# Patient Record
Sex: MALE | Race: BLACK OR AFRICAN AMERICAN | Employment: FULL TIME | URBAN - METROPOLITAN AREA
[De-identification: names, ages, dates, MRNs, and addresses within clinical notes are randomized per-mention and may not be internally consistent; named-entity substitution may affect disease eponyms.]

---

## 2021-06-01 ENCOUNTER — VIRTUAL VISIT (OUTPATIENT)
Dept: FAMILY MEDICINE CLINIC | Age: 26
End: 2021-06-01
Payer: OTHER GOVERNMENT

## 2021-06-01 DIAGNOSIS — R36.9 ABNORMAL PENILE DISCHARGE: Primary | ICD-10-CM

## 2021-06-01 DIAGNOSIS — Z72.51 HIGH RISK SEXUAL BEHAVIOR, UNSPECIFIED TYPE: ICD-10-CM

## 2021-06-01 DIAGNOSIS — R21 PENILE RASH: ICD-10-CM

## 2021-06-01 DIAGNOSIS — Z20.2 EXPOSURE TO SEXUALLY TRANSMITTED DISEASE (STD): ICD-10-CM

## 2021-06-01 PROCEDURE — 99203 OFFICE O/P NEW LOW 30 MIN: CPT | Performed by: FAMILY MEDICINE

## 2021-06-01 RX ORDER — DOXYCYCLINE 100 MG/1
100 CAPSULE ORAL 2 TIMES DAILY
Qty: 20 CAPSULE | Refills: 0 | Status: SHIPPED | OUTPATIENT
Start: 2021-06-01 | End: 2021-06-11

## 2021-06-01 RX ORDER — AZITHROMYCIN 250 MG/1
500 TABLET, FILM COATED ORAL 2 TIMES DAILY
Qty: 4 TABLET | Refills: 0 | Status: SHIPPED | OUTPATIENT
Start: 2021-06-01 | End: 2021-06-02

## 2021-06-01 NOTE — PROGRESS NOTES
HISTORY OF PRESENT ILLNESS  Gregor Heredia is a 32 y.o. male. Rash of the head of the penis    Started few weeks ago not better tried alcohol washing and OTC antibiotic ointments, dosenot tingles and not pain full, states that is notexpanding red, and not  swelled up, whitish patches rounds, with itchiness     penile Discharge   The history is provided by the patient. This is a new problem. The current episode started more than 1 week ago. The problem occurs daily. The problem has not changed since onset. The discharge occurs spontaneously. The discharge was milky, but not cottage cheese like. he has no dyspareunia, frequency, genital burning, genital itching and finally no odor, no dysuria and no genital lesions. his past medical history does not include STD. Specific concerns today: std check pt with hx of unsafe sex denies discharge having no rash no sore throat not using condoms that much, known std expo wants to get all checked has no fever      No Known Allergies  History reviewed. No pertinent past medical history. History reviewed. No pertinent surgical history. Family History   Problem Relation Age of Onset    Diabetes Mother     No Known Problems Father      Social History     Tobacco Use    Smoking status: Never Smoker    Smokeless tobacco: Never Used   Substance Use Topics    Alcohol use: No     Alcohol/week: 0.0 standard drinks      Lab Results   Component Value Date/Time    Hemoglobin (POC) 15.7 07/15/2016 11:00 AM     No results found for: TSH, TSH2, TSH3, TSHP, TSHELE, TSHEXT, TT3, T3U, T3UP, FRT3, FT3, FT4, FT4P, T4, T4P, FT4T, TT7, TSHEXT      Review of Systems   Constitutional: Negative for chills, fever and malaise/fatigue. HENT: Negative for nosebleeds. Eyes: Negative for pain. Respiratory: Negative for cough and wheezing. Cardiovascular: Negative for chest pain and leg swelling. Gastrointestinal: Negative for constipation, diarrhea and nausea.    Genitourinary: Negative for frequency. Musculoskeletal: Negative for joint pain and myalgias. Skin: Negative for rash. Neurological: Negative for speech change, loss of consciousness and headaches. Endo/Heme/Allergies: Does not bruise/bleed easily. Psychiatric/Behavioral: Negative for depression. The patient is not nervous/anxious and does not have insomnia. All other systems reviewed and are negative. Physical Exam  Constitutional:       Appearance: Normal appearance. HENT:      Head: Normocephalic and atraumatic. Nose: Nose normal. No congestion. Neurological:      Mental Status: He is alert and oriented to person, place, and time. Psychiatric:         Mood and Affect: Mood normal.         Behavior: Behavior normal.         Thought Content: Thought content normal.         Judgment: Judgment normal.         ASSESSMENT and PLAN  Orders Placed This Encounter    CT/NG/T.VAGINALIS AMPLIFICATION    RPR    HIV 1/2 AG/AB, 4TH GENERATION,W RFLX CONFIRM    HEPATITIS PANEL, ACUTE    URINALYSIS W/ RFLX MICROSCOPIC     Diagnoses and all orders for this visit:    1. Abnormal penile discharge  -     CT/NG/T.VAGINALIS AMPLIFICATION; Future  -     RPR; Future  -     HIV 1/2 AG/AB, 4TH GENERATION,W RFLX CONFIRM; Future  -     HEPATITIS PANEL, ACUTE; Future  -     URINALYSIS W/ RFLX MICROSCOPIC; Future    2. Exposure to sexually transmitted disease (STD)  -     CT/NG/T.VAGINALIS AMPLIFICATION; Future  -     RPR; Future  -     HIV 1/2 AG/AB, 4TH GENERATION,W RFLX CONFIRM; Future  -     HEPATITIS PANEL, ACUTE; Future  -     URINALYSIS W/ RFLX MICROSCOPIC; Future    3. High risk sexual behavior, unspecified type  -     CT/NG/T.VAGINALIS AMPLIFICATION; Future  -     RPR; Future  -     HIV 1/2 AG/AB, 4TH GENERATION,W RFLX CONFIRM; Future  -     HEPATITIS PANEL, ACUTE; Future  -     URINALYSIS W/ RFLX MICROSCOPIC; Future    4. Penile rash  -     CT/NG/T.VAGINALIS AMPLIFICATION; Future  -     RPR;  Future  -     HIV 1/2 AG/AB, 4TH GENERATION,W RFLX CONFIRM; Future  -     HEPATITIS PANEL, ACUTE; Future  -     URINALYSIS W/ RFLX MICROSCOPIC; Future    Other orders  -     doxycycline (VIBRAMYCIN) 100 mg capsule; Take 1 Capsule by mouth two (2) times a day for 10 days. -     azithromycin (ZITHROMAX) 250 mg tablet; Take 2 Tablets by mouth two (2) times a day for 1 day.       Advised patient for sexual acts abstinence till the results become available  Safe sex, compliancy with antibiotic at this time

## 2021-06-01 NOTE — PROGRESS NOTES
Chief Complaint   Patient presents with   [address] [place] [address] Yevgeniy Establish Care     1. Have you been to the ER, urgent care clinic since your last visit? Hospitalized since your last visit? No    2. Have you seen or consulted any other health care providers outside of the [address] [place] [address] since your last visit? Include any pap smears or colon screening. No    Call placed to pt. Verified patient with two type of identifiers. Here to establish care,  No pcp,  C/o penile itching x 1 week,  Small amount white discharge,  Denies rash,  Unsure of std exposure.

## 2021-06-02 LAB
APPEARANCE UR: CLEAR
BACTERIA URNS QL MICRO: NEGATIVE /HPF
BILIRUB UR QL: NEGATIVE
COLOR UR: ABNORMAL
EPITH CASTS URNS QL MICRO: ABNORMAL /LPF
GLUCOSE UR STRIP.AUTO-MCNC: NEGATIVE MG/DL
HAV IGM SER QL: NONREACTIVE
HBV CORE IGM SER QL: REACTIVE
HBV SURFACE AG SER QL: 909.65 INDEX
HBV SURFACE AG SER QL: POSITIVE
HCV AB SERPL QL IA: NONREACTIVE
HCV COMMENT,HCGAC: ABNORMAL
HGB UR QL STRIP: NEGATIVE
HIV 1+2 AB+HIV1 P24 AG SERPL QL IA: NONREACTIVE
HIV12 RESULT COMMENT, HHIVC: NORMAL
KETONES UR QL STRIP.AUTO: NEGATIVE MG/DL
LEUKOCYTE ESTERASE UR QL STRIP.AUTO: NEGATIVE
NITRITE UR QL STRIP.AUTO: NEGATIVE
PH UR STRIP: 6.5 [PH] (ref 5–8)
PROT UR STRIP-MCNC: 30 MG/DL
RBC #/AREA URNS HPF: ABNORMAL /HPF (ref 0–5)
RPR SER QL: NONREACTIVE
SP GR UR REFRACTOMETRY: 1.01 (ref 1–1.03)
SP1: ABNORMAL
SP2: ABNORMAL
SP3: ABNORMAL
UROBILINOGEN UR QL STRIP.AUTO: 0.2 EU/DL (ref 0.2–1)
WBC URNS QL MICRO: ABNORMAL /HPF (ref 0–4)

## 2021-06-04 LAB
C TRACH RRNA SPEC QL NAA+PROBE: NEGATIVE
N GONORRHOEA RRNA SPEC QL NAA+PROBE: NEGATIVE
SPECIMEN SOURCE: NORMAL
T VAGINALIS RRNA VAG QL NAA+PROBE: NEGATIVE

## 2021-06-10 DIAGNOSIS — R76.8 HEPATITIS B SURFACE ANTIGEN POSITIVE: Primary | ICD-10-CM

## 2021-06-10 DIAGNOSIS — R76.8 HEPATITIS B CORE ANTIBODY POSITIVE: ICD-10-CM

## 2021-06-10 NOTE — PROGRESS NOTES
Lab result discussed with the patient possible hepatitis b safe sex advised rheumatologist referral repeat in 3 months

## 2021-06-10 NOTE — PROGRESS NOTES
At this time patient lab results discussed patient advised to have safe sexual contact follow-up with oncologist for further care erepeat st result in 3 months

## 2021-06-15 ENCOUNTER — PATIENT MESSAGE (OUTPATIENT)
Dept: FAMILY MEDICINE CLINIC | Age: 26
End: 2021-06-15

## 2021-06-16 PROBLEM — B19.10 HEP B W/O COMA: Status: ACTIVE | Noted: 2021-06-16

## 2021-06-17 ENCOUNTER — OFFICE VISIT (OUTPATIENT)
Dept: HEMATOLOGY | Age: 26
End: 2021-06-17
Payer: OTHER GOVERNMENT

## 2021-06-17 VITALS
OXYGEN SATURATION: 99 % | SYSTOLIC BLOOD PRESSURE: 130 MMHG | TEMPERATURE: 96.1 F | HEART RATE: 71 BPM | HEIGHT: 68 IN | RESPIRATION RATE: 16 BRPM | BODY MASS INDEX: 24.4 KG/M2 | DIASTOLIC BLOOD PRESSURE: 83 MMHG | WEIGHT: 161 LBS

## 2021-06-17 DIAGNOSIS — B19.10 HEP B W/O COMA: Primary | ICD-10-CM

## 2021-06-17 LAB
BUN SERPL-MCNC: 8 MG/DL (ref 6–20)
BUN/CREAT SERPL: 7 (ref 9–20)
CALCIUM SERPL-MCNC: 10.2 MG/DL (ref 8.7–10.2)
CHLORIDE SERPL-SCNC: 101 MMOL/L (ref 96–106)
CO2 SERPL-SCNC: 26 MMOL/L (ref 20–29)
CREAT SERPL-MCNC: 1.23 MG/DL (ref 0.76–1.27)
ERYTHROCYTE [DISTWIDTH] IN BLOOD BY AUTOMATED COUNT: 13.2 % (ref 11.6–15.4)
GLUCOSE SERPL-MCNC: 91 MG/DL (ref 65–99)
HCT VFR BLD AUTO: 48.9 % (ref 37.5–51)
HGB BLD-MCNC: 15.9 G/DL (ref 13–17.7)
MCH RBC QN AUTO: 26.3 PG (ref 26.6–33)
MCHC RBC AUTO-ENTMCNC: 32.5 G/DL (ref 31.5–35.7)
MCV RBC AUTO: 81 FL (ref 79–97)
PLATELET # BLD AUTO: 222 X10E3/UL (ref 150–450)
POTASSIUM SERPL-SCNC: 4.3 MMOL/L (ref 3.5–5.2)
RBC # BLD AUTO: 6.04 X10E6/UL (ref 4.14–5.8)
SODIUM SERPL-SCNC: 139 MMOL/L (ref 134–144)
WBC # BLD AUTO: 3.8 X10E3/UL (ref 3.4–10.8)

## 2021-06-17 PROCEDURE — 99204 OFFICE O/P NEW MOD 45 MIN: CPT | Performed by: NURSE PRACTITIONER

## 2021-06-17 NOTE — PROGRESS NOTES
Luan Gates is a 32 y.o. male  Chief Complaint   Patient presents with    New Patient     There are no preventive care reminders to display for this patient. Visit Vitals  /83   Pulse 71   Temp (!) 96.1 °F (35.6 °C) (Temporal)   Resp 16   Ht 5' 7.72\" (1.72 m)   Wt 161 lb (73 kg)   SpO2 99%   BMI 24.68 kg/m²     1. Have you been to the ER, urgent care clinic since your last visit? Hospitalized since your last visit?  No

## 2021-06-17 NOTE — PROGRESS NOTES
[address] [facility], [place], [address], Cite Maribeth Diaz MD Johnathan Race, PA-C Narda Aw, ACNP-BC     Nell Ken, [facility]   ADINA Owens, [facility]       [facility] [address]    at [address], [address] Raul Daniel  22.    [phone]    FAX: [address] [place] [address]    at [address], [address]    [phone]    FAX: [phone]     Patient Care Team:  Dk Castro MD as PCP - General (Family Medicine)  Dk Castro MD as PCP - [facility]    Patient Active Problem List   Diagnosis Code    Hep B w/o coma B19.10     The clinician listed above has asked me to see Mykel Lawrence  in consultation regarding chronic HBV and its management. All medical records sent by the referring physicians were reviewed. The patient is a 32 y.o. Black who has tested positive for HBV in 2021 while having an STD panel performed due to possible exposure. Risk factors for acquiring HBV are immigration from [place] and [place]. There was an episode of acute icteric hepatitis around 2011. He was told he had jaundice. Imaging of the liver was either not performed or the results are not available to me. An assessment of liver fibrosis with biopsy or elastography has not been performed. The patient has never received treatment for chronic HBV. The patient has no symptoms which can be attributed to the liver disorder. The patient is not currently experiencing the following symptoms of liver disease: pain in the right side over the liver, yellowing of the eyes or skin, dark urine or swelling of the abdomen.     The patient completes all daily activities without any functional limitations. ASSESSMENT AND PLAN:  Chronic HBV    The degree of fibrosis has not yet been determined. The level of HBV DNA has not yet been determined. The patient is not currently receiving treatment for HBV. Will perform laboratory testing to monitor liver function and degree of liver injury. This includes BMP, hepatic panel, CBC with platelet count and INR. Will perform serologic and virologic testing of HBV to assess need for treatment, type of treatment, likelihood of responding to treatment and the duration of therapy. Will perform serologic and virologic studies to assess for other causes of chronic liver disease. Will perform imaging of the liver with ultrasound. The need to perform an assessment of liver fibrosis was discussed with the patient. The FibroScan can assess liver fibrosis and determine if a patient has advanced fibrosis or cirrhosis without the need for liver biopsy. This will be performed at the next office visit. If the FibroScan suggests advanced fibrosis, then a liver biopsy should be considered. The FibroScan can be repeated annually or as often as clinically indicated to assess for fibrosis progression and/or regression. Screening for hepatocellular carcinoma  HCC screening in patients with HBV should be performed with ultrasound on an annual basis in patients with inactive HBV who are under the age of 48 years. Over the age of 48 years, [address] screening with ultrasound should be performed every 6 months. Treatment of other medical problems in patients with chronic liver disease  There are no contraindications for the patient to take most medications necessary for treatment of other medical issues. The patient can take any medications utilized for treatment of DM and/or statins to treat hypercholesterolemia. The patient does not consume alcohol on a daily basis.  Normal doses of acetaminophen, as recommended on the label of the bottle, are not hepatotoxic except in the setting of daily alcohol use, even in patients with cirrhosis and can be utilized for pain. Counseling for alcohol in patients with chronic liver disease  The patient was counseled regarding alcohol consumption and the effect of alcohol on chronic liver disease. The patient does not consume any significant amount of alcohol. Vaccinations   The need for vaccination against viral hepatitis A will be assessed with serologic and instituted as appropriate. Routine vaccinations against other bacterial and viral agents can be performed as indicated. Annual flu vaccination should be administered if indicated. No Known Allergies    No current outpatient medications on file prior to visit. No current facility-administered medications on file prior to visit. FAMILY HISTORY:  Mom living with diabetes. It is unknown if she has HBV. The patient does not know his father's medical history. There is no family history of liver disease. There is no family history of immune disorders. SOCIAL HISTORY:  The patient is single. The patient has no children. The patient has never used tobacco products. The patient has never consumed significant amounts of alcohol. The patient currently works full time as an [employer] . He is also a member of the [employer]. PHYSICAL EXAMINATION:  Visit Vitals  /83   Pulse 71   Temp (!) 96.1 °F (35.6 °C) (Temporal)   Resp 16   Ht 5' 7.72\" (1.72 m)   Wt 161 lb (73 kg)   SpO2 99%   BMI 24.68 kg/m²     General: No acute distress. Eyes: Sclera anicteric. ENT: No oral lesions. Nodes: No adenopathy. Skin: No spider angiomata. No jaundice. No palmar erythema. Respiratory: Lungs clear to auscultation. Cardiovascular: Regular heart rate. No murmurs. No JVD. Abdomen: Soft non-tender. Liver size normal to percussion/palpation. Spleen not palpable. No obvious ascites. Extremities: No edema. No muscle wasting. No gross arthritic changes. Neurologic: Alert and oriented. Cranial nerves grossly intact. No asterixis. LABORATORY STUDIES:  Recent liver function panel, CBC with platelet count and BMP are not available. These studies will be performed. SEROLOGIES:  Serologies Latest Ref Rng & Units 6/1/2021   Hep B Surface Ag Index 909.65   Hep B Surface Ag Interp Negative   Positive (A)   Hep C Ab NONREACTIVE   NONREACTIVE     LIVER HISTOLOGY:  Not available or performed    ENDOSCOPIC PROCEDURES:  Not available or performed    RADIOLOGY:  Not available or performed    OTHER TESTING:  Not available or performed    FOLLOW-UP:  All of the issues listed above in the assessment and plan were discussed with the patient. All questions were answered. The patient expressed a clear understanding of the above. [facility] [place] [address] in 4 weeks for FibroScan, to review all data and determine the treatment plan. He is at training for [employer] until September. I will call/message him with the lab results and plan. We will schedule the FibroScan after his training is complete.     Ravi Braswell, [facility] Center[facility]LVAIN-BC  Liver [place] [address] [address] Raul Daniel  22.  [phone]

## 2021-06-18 LAB
ALBUMIN SERPL-MCNC: 5.1 G/DL (ref 4.1–5.2)
ALP SERPL-CCNC: 100 IU/L (ref 48–121)
ALT SERPL-CCNC: 46 IU/L (ref 0–44)
AST SERPL-CCNC: 44 IU/L (ref 0–40)
BILIRUB DIRECT SERPL-MCNC: 0.14 MG/DL (ref 0–0.4)
BILIRUB SERPL-MCNC: 0.5 MG/DL (ref 0–1.2)
HAV AB SER QL IA: POSITIVE
HBV CORE AB SERPL QL IA: POSITIVE
HBV DNA SERPL NAA+PROBE-ACNC: NORMAL IU/ML
HBV DNA SERPL NAA+PROBE-ACNC: NORMAL IU/ML
HBV DNA SERPL NAA+PROBE-LOG IU: 8.56 LOG10 IU/ML
HBV E AB SERPL QL IA: NEGATIVE
HBV E AG SERPL QL IA: POSITIVE
HBV SURFACE AB SER QL: REACTIVE
INR PPP: 1 (ref 0.9–1.2)
PROT SERPL-MCNC: 8 G/DL (ref 6–8.5)
PROTHROMBIN TIME: 11 SEC (ref 9.1–12)
REF LAB TEST REF RANGE: NORMAL

## 2021-06-23 LAB — HDV AB SER QL IA: NEGATIVE

## 2021-09-23 ENCOUNTER — OFFICE VISIT (OUTPATIENT)
Dept: HEMATOLOGY | Age: 26
End: 2021-09-23
Payer: OTHER GOVERNMENT

## 2021-09-23 VITALS
WEIGHT: 160 LBS | BODY MASS INDEX: 24.25 KG/M2 | TEMPERATURE: 96.9 F | RESPIRATION RATE: 16 BRPM | HEART RATE: 69 BPM | SYSTOLIC BLOOD PRESSURE: 114 MMHG | OXYGEN SATURATION: 98 % | HEIGHT: 68 IN | DIASTOLIC BLOOD PRESSURE: 74 MMHG

## 2021-09-23 DIAGNOSIS — B18.0 HEP B W/O COMA, CHRONIC, W/ DELTA (HCC): Primary | ICD-10-CM

## 2021-09-23 LAB
ALBUMIN SERPL-MCNC: 4.8 G/DL (ref 3.5–5)
ALBUMIN/GLOB SERPL: 1.3 {RATIO} (ref 1.1–2.2)
ALP SERPL-CCNC: 103 U/L (ref 45–117)
ALT SERPL-CCNC: 65 U/L (ref 12–78)
ANION GAP SERPL CALC-SCNC: 4 MMOL/L (ref 5–15)
AST SERPL-CCNC: 45 U/L (ref 15–37)
BILIRUB DIRECT SERPL-MCNC: 0.2 MG/DL (ref 0–0.2)
BILIRUB SERPL-MCNC: 0.6 MG/DL (ref 0.2–1)
BUN SERPL-MCNC: 9 MG/DL (ref 6–20)
BUN/CREAT SERPL: 8 (ref 12–20)
CALCIUM SERPL-MCNC: 9.6 MG/DL (ref 8.5–10.1)
CHLORIDE SERPL-SCNC: 103 MMOL/L (ref 97–108)
CO2 SERPL-SCNC: 29 MMOL/L (ref 21–32)
CREAT SERPL-MCNC: 1.07 MG/DL (ref 0.7–1.3)
ERYTHROCYTE [DISTWIDTH] IN BLOOD BY AUTOMATED COUNT: 12.6 % (ref 11.5–14.5)
GLOBULIN SER CALC-MCNC: 3.7 G/DL (ref 2–4)
GLUCOSE SERPL-MCNC: 85 MG/DL (ref 65–100)
HCT VFR BLD AUTO: 50.9 % (ref 36.6–50.3)
HGB BLD-MCNC: 16 G/DL (ref 12.1–17)
INR PPP: 1 (ref 0.9–1.1)
MCH RBC QN AUTO: 25.7 PG (ref 26–34)
MCHC RBC AUTO-ENTMCNC: 31.4 G/DL (ref 30–36.5)
MCV RBC AUTO: 81.8 FL (ref 80–99)
NRBC # BLD: 0 K/UL (ref 0–0.01)
NRBC BLD-RTO: 0 PER 100 WBC
PLATELET # BLD AUTO: 213 K/UL (ref 150–400)
PMV BLD AUTO: 11.2 FL (ref 8.9–12.9)
POTASSIUM SERPL-SCNC: 4.1 MMOL/L (ref 3.5–5.1)
PROT SERPL-MCNC: 8.5 G/DL (ref 6.4–8.2)
PROTHROMBIN TIME: 10.8 SEC (ref 9–11.1)
RBC # BLD AUTO: 6.22 M/UL (ref 4.1–5.7)
SODIUM SERPL-SCNC: 136 MMOL/L (ref 136–145)
WBC # BLD AUTO: 4.6 K/UL (ref 4.1–11.1)

## 2021-09-23 PROCEDURE — 91200 LIVER ELASTOGRAPHY: CPT | Performed by: NURSE PRACTITIONER

## 2021-09-23 PROCEDURE — 99214 OFFICE O/P EST MOD 30 MIN: CPT | Performed by: NURSE PRACTITIONER

## 2021-09-23 NOTE — PROGRESS NOTES
[address] [facility] [address], [place], [address] [address], [facility] Nolan Bustillo, MD Tiffani Sierra PA-C Lari Pal, [facility]-BC     Nell Ken, [facility]   Gunner Gonzales, SYLVAIN-MO Myers, [facility]       Kandicearlyn Fox Andrea [address]    at [address], [address] [place] [address], Shwetaliudmila  22.    [phone]    FAX: [address] [place] [address]    at [address], [address]    [phone]    FAX: [phone]     Patient Care Team:  Cici Campos MD as PCP - General (Family Medicine)  Cici Campos MD as PCP - [facility] Provider    Patient Active Problem List   Diagnosis Code    Hep B w/o coma B19.10     Juan Motat is being seen at [facility] for management of chronic HBV. The active problem list, all pertinent past medical history, medications and laboratory findings related to the liver disorder were reviewed and discussed with the patient. The patient is a 32 y.o. Black who has tested positive for HBV in 2021 while having an STD panel performed due to possible exposure. Risk factors for acquiring HBV are immigration from [place] and [place]. There was an episode of acute icteric hepatitis around 2011. He was told he had jaundice. An assessment of liver fibrosis with elastography will be performed during this office visit. The patient has never received treatment for chronic HBV. The patient has no symptoms which can be attributed to the liver disorder. The patient is not currently experiencing the following symptoms of liver disease: pain in the right side over the liver, yellowing of the eyes or skin, dark urine or swelling of the abdomen. The patient completes all daily activities without any functional limitations.     ASSESSMENT AND PLAN:  Chronic HBV  The patient has no fibrosis. He has a high viral load at 361,000,000. His ALT is mildly elevated. I discussed the patient with Dr. Kofi Lopez and we decided the patient can make the decision about starting therapy. Since he has no fibrosis, I am comfortable waiting on initiating treatment. I will get labs today and if the LFTs are more elevated, I say initiate treatment. I advised the patient he is contagious and to practice safe sex every single time. He needs to let any future partner know about his active infection. The patient is not currently receiving treatment for HBV. Will check viral load again today. Will perform laboratory testing to monitor liver function and degree of liver injury. This includes BMP, hepatic panel, CBC with platelet count and INR. The FibroScan can be repeated annually or as often as clinically indicated to assess for fibrosis progression and/or regression. Screening for hepatocellular carcinoma  HCC screening in patients with HBV should be performed with ultrasound on an annual basis in patients with inactive HBV who are under the age of 48 years. This was ordered today. Treatment of other medical problems in patients with chronic liver disease  There are no contraindications for the patient to take most medications necessary for treatment of other medical issues. The patient can take any medications utilized for treatment of DM and/or statins to treat hypercholesterolemia. The patient does not consume alcohol on a daily basis. Normal doses of acetaminophen, as recommended on the label of the bottle, are not hepatotoxic except in the setting of daily alcohol use, even in patients with cirrhosis and can be utilized for pain. Counseling for alcohol in patients with chronic liver disease  The patient was counseled regarding alcohol consumption and the effect of alcohol on chronic liver disease.     The patient does not consume any significant amount of alcohol. Vaccinations   He has documented immunity to viral hepatitis A. Routine vaccinations against other bacterial and viral agents can be performed as indicated. Annual flu vaccination should be administered if indicated. No Known Allergies    No current outpatient medications on file prior to visit. No current facility-administered medications on file prior to visit. FAMILY HISTORY:  Mom living with diabetes. It is unknown if she has HBV. The patient does not know his father's medical history. There is no family history of liver disease. There is no family history of immune disorders. SOCIAL HISTORY:  The patient is single. The patient has no children. The patient has never used tobacco products. The patient has never consumed significant amounts of alcohol. The patient currently works full time as an [employer] . He is also a member of the [employer]. PHYSICAL EXAMINATION:  Visit Vitals  /74 (BP 1 Location: Left upper arm, BP Patient Position: Sitting, BP Cuff Size: Adult)   Pulse 69   Temp 96.9 °F (36.1 °C) (Temporal)   Resp 16   Ht 5' 7.72\" (1.72 m)   Wt 160 lb (72.6 kg)   SpO2 98%   BMI 24.53 kg/m²     General: No acute distress. Eyes: Sclera anicteric. ENT: No oral lesions. Nodes: No adenopathy. Skin: No spider angiomata. No jaundice. No palmar erythema. Respiratory: Lungs clear to auscultation. Cardiovascular: Regular heart rate. No murmurs. No JVD. Abdomen: Soft non-tender. Liver size normal to percussion/palpation. Spleen not palpable. No obvious ascites. Extremities: No edema. No muscle wasting. No gross arthritic changes. Neurologic: Alert and oriented. Cranial nerves grossly intact. No asterixis.     LABORATORY STUDIES:  Liver [place] [address] Units 6/17/2021   WBC 3.4 - 10.8 x10E3/uL 3.8   HGB 13.0 - 17.7 g/dL 15.9    - 450 x10E3/uL 222   INR 0.9 - 1.2 1.0   AST 0 - 40 IU/L 44 (H)   ALT 0 - 44 IU/L 46 (H)   Alk Phos 48 - 121 IU/L 100   Bili, Total 0.0 - 1.2 mg/dL 0.5   Bili, Direct 0.00 - 0.40 mg/dL 0.14   Albumin 4.1 - 5.2 g/dL 5.1   BUN 6 - 20 mg/dL 8   Creat 0.76 - 1.27 mg/dL 1.23   Na 134 - 144 mmol/L 139   K 3.5 - 5.2 mmol/L 4.3   Cl 96 - 106 mmol/L 101   CO2 20 - 29 mmol/L 26   Glucose 65 - 99 mg/dL 91     SEROLOGIES:  Virology Latest Ref Rng & Units 6/17/2021 6/17/2021   HBV DNA IU/mL 361,000,000 See Final Results     Serologies Latest Ref Rng & Units 6/17/2021 6/1/2021   Hep A Ab, Total Negative Positive (A)    Hep B Surface Ag Index  909.65   Hep B Surface Ag Interp Negative    Positive (A)   Hep B Core Ab, Total Negative Positive (A)    Hep B Surface AB QL  Reactive    Hep C Ab NONREACTIVE    NONREACTIVE   Hep D AB Negative Negative      LIVER HISTOLOGY:  9/2021. FibroScan performed at [address]. EkPa was 5.4. IQR/med 4%. . The results suggested a fibrosis level of F0. The CAP score suggests fatty liver. ENDOSCOPIC PROCEDURES:  Not available or performed    RADIOLOGY:  Not available or performed    OTHER TESTING:  Not available or performed    FOLLOW-UP:  All of the issues listed above in the assessment and plan were discussed with the patient. All questions were answered. The patient expressed a clear understanding of the above. [facility] 87 in 6 months. I will call him next week after results are back and discuss options. I have provided him with a [employer] pamphlet to review in the interim.      Sammy Bettencourt, [facility] CenterP-BC  Liver [place] [address], [address] [facility], [facility] 22. [phone]

## 2021-09-23 NOTE — PROGRESS NOTES
Identified pt with two pt identifiers(name and ). Reviewed record in preparation for visit and have obtained necessary documentation. Chief Complaint   Patient presents with    Hepatitis B     Fibroscan f/u      Vitals:    21 0836   BP: 114/74   Pulse: 69   Resp: 16   Temp: 96.9 °F (36.1 °C)   TempSrc: Temporal   SpO2: 98%   Weight: 160 lb (72.6 kg)   Height: 5' 7.72\" (1.72 m)   PainSc:   0 - No pain       Health Maintenance Review: Patient reminded of \"due or due soon\" health maintenance. I have asked the patient to contact his/her primary care provider (PCP) for follow-up on his/her health maintenance. Coordination of Care Questionnaire:  :   1) Have you been to an emergency room, urgent care, or hospitalized since your last visit? If yes, where when, and reason for visit? no       2. Have seen or consulted any other health care provider since your last visit? If yes, where when, and reason for visit? NO      Patient is accompanied by self I have received verbal consent from Ely Godwin to discuss any/all medical information while they are present in the room.

## 2021-09-24 LAB
AFP L3 MFR SERPL: NORMAL % (ref 0–9.9)
AFP SERPL-MCNC: 2.6 NG/ML (ref 0–8)

## 2021-09-26 LAB
HBV DNA SERPL NAA+PROBE-ACNC: NORMAL IU/ML
HBV IU/ML: NORMAL IU/ML
LOG10 HBV AS IU/ML 551596: 8.46 LOG10 IU/ML
TEST INFORMATION: NORMAL

## 2021-09-27 ENCOUNTER — HOSPITAL ENCOUNTER (OUTPATIENT)
Dept: ULTRASOUND IMAGING | Age: 26
Discharge: HOME | End: 2021-09-27
Payer: OTHER GOVERNMENT

## 2021-09-27 DIAGNOSIS — B18.0 HEP B W/O COMA, CHRONIC, W/ DELTA (HCC): ICD-10-CM

## 2021-09-27 PROCEDURE — 76700 US EXAM ABDOM COMPLETE: CPT | Performed by: NURSE PRACTITIONER

## 2021-09-27 NOTE — PROGRESS NOTES
Flory Montes, no masses or lesions on your ultrasound. Your viral load is high again and your liver numbers went up a little bit on your labs. I'm still planning on calling you to discuss treatment. It may be this afternoon or tomorrow.      Der

## 2021-10-12 NOTE — PROGRESS NOTES
Called pt to recommend starting treatment. He does not wish to proceed at this time. Before we had discussed waiting but this check his ALT was more elevated and his VL is still high. However, he still has no scarring. He would like to wait on starting treatment and see how his numbers look at the next follow up visit in March and make a decision about starting treatment at that time. That is fine.

## 2022-03-19 PROBLEM — B19.10 HEP B W/O COMA: Status: ACTIVE | Noted: 2021-06-16

## 2022-03-24 ENCOUNTER — OFFICE VISIT (OUTPATIENT)
Dept: HEMATOLOGY | Age: 27
End: 2022-03-24
Payer: OTHER GOVERNMENT

## 2022-03-24 VITALS
WEIGHT: 157 LBS | SYSTOLIC BLOOD PRESSURE: 112 MMHG | BODY MASS INDEX: 24.64 KG/M2 | TEMPERATURE: 96.7 F | DIASTOLIC BLOOD PRESSURE: 77 MMHG | HEART RATE: 77 BPM | HEIGHT: 67 IN | RESPIRATION RATE: 16 BRPM | OXYGEN SATURATION: 99 %

## 2022-03-24 DIAGNOSIS — B19.10 HEP B W/O COMA: Primary | ICD-10-CM

## 2022-03-24 LAB
ALBUMIN SERPL-MCNC: 4.8 G/DL (ref 3.5–5)
ALBUMIN/GLOB SERPL: 1.5 {RATIO} (ref 1.1–2.2)
ALP SERPL-CCNC: 89 U/L (ref 45–117)
ALT SERPL-CCNC: 66 U/L (ref 12–78)
ANION GAP SERPL CALC-SCNC: 6 MMOL/L (ref 5–15)
AST SERPL-CCNC: 33 U/L (ref 15–37)
BILIRUB DIRECT SERPL-MCNC: 0.1 MG/DL (ref 0–0.2)
BILIRUB SERPL-MCNC: 0.4 MG/DL (ref 0.2–1)
BUN SERPL-MCNC: 9 MG/DL (ref 6–20)
BUN/CREAT SERPL: 9 (ref 12–20)
CALCIUM SERPL-MCNC: 9.7 MG/DL (ref 8.5–10.1)
CHLORIDE SERPL-SCNC: 104 MMOL/L (ref 97–108)
CO2 SERPL-SCNC: 27 MMOL/L (ref 21–32)
CREAT SERPL-MCNC: 1.02 MG/DL (ref 0.7–1.3)
ERYTHROCYTE [DISTWIDTH] IN BLOOD BY AUTOMATED COUNT: 13.4 % (ref 11.5–14.5)
GLOBULIN SER CALC-MCNC: 3.3 G/DL (ref 2–4)
GLUCOSE SERPL-MCNC: 92 MG/DL (ref 65–100)
HCT VFR BLD AUTO: 49.9 % (ref 36.6–50.3)
HGB BLD-MCNC: 15.7 G/DL (ref 12.1–17)
INR PPP: 1 (ref 0.9–1.1)
MCH RBC QN AUTO: 26.2 PG (ref 26–34)
MCHC RBC AUTO-ENTMCNC: 31.5 G/DL (ref 30–36.5)
MCV RBC AUTO: 83.2 FL (ref 80–99)
NRBC # BLD: 0 K/UL (ref 0–0.01)
NRBC BLD-RTO: 0 PER 100 WBC
PLATELET # BLD AUTO: 195 K/UL (ref 150–400)
PMV BLD AUTO: 11.3 FL (ref 8.9–12.9)
POTASSIUM SERPL-SCNC: 4.2 MMOL/L (ref 3.5–5.1)
PROT SERPL-MCNC: 8.1 G/DL (ref 6.4–8.2)
PROTHROMBIN TIME: 10.9 SEC (ref 9–11.1)
RBC # BLD AUTO: 6 M/UL (ref 4.1–5.7)
SODIUM SERPL-SCNC: 137 MMOL/L (ref 136–145)
WBC # BLD AUTO: 3.8 K/UL (ref 4.1–11.1)

## 2022-03-24 PROCEDURE — 99214 OFFICE O/P EST MOD 30 MIN: CPT | Performed by: NURSE PRACTITIONER

## 2022-03-24 NOTE — PROGRESS NOTES
[address] [facility], [place], [address], [facility], [place]      Lizzie Casas, JIMENA Cullen, ACNP-BC     Nell Ken, [facility]aleNP-BC   LAWRENCE VerduzcoP-MO Cid, [facility]       Kandice Fox [facility] [address]    at [address], [facility] Raul Daniel  22.    [phone]    FAX: [address] [place] [address]    at [address], [address]    [phone]    FAX: [phone]     Patient Care Team:  Román Norwood MD as PCP - General (Family Medicine)  Román Norwood MD as PCP - [facility]    Patient Active Problem List   Diagnosis Code    Hep B w/o coma B19.10     Calista Damon is being seen at [address] for management of chronic HBV. The active problem list, all pertinent past medical history, medications and laboratory findings related to the liver disorder were reviewed and discussed with the patient. The patient is a 32 y.o. Black who has tested positive for HBV in 2021 while having an STD panel performed due to possible exposure. Risk factors for acquiring HBV are immigration from [place] and [place]. There was an episode of acute icteric hepatitis around 2011. He was told he had jaundice. An assessment of liver fibrosis with elastography will be performed during this office visit. The patient has never received treatment for chronic HBV. The patient has no symptoms which can be attributed to the liver disorder. The patient is not currently experiencing the following symptoms of liver disease: pain in the right side over the liver, yellowing of the eyes or skin, dark urine or swelling of the abdomen. The patient completes all daily activities without any functional limitations.     ASSESSMENT AND PLAN:  Chronic HBV  The patient has no fibrosis. He has a high viral load at 361,000,000. His ALT is mildly elevated. We discussed treatment again today. Without fibrosis and his age, I'm still hesitant to start treatment. I gave him the option of starting treatment and he would like to continue to wait at this time. I advised the patient he is contagious and to practice safe sex every single time. He needs to let any future partner know about his active infection. The patient is not currently receiving treatment for HBV. Will check viral load again today. Will perform laboratory testing to monitor liver function and degree of liver injury. This includes BMP, hepatic panel, CBC with platelet count and INR. Labs today and labs/US for before next visit. Next visit I will repeat the FibroScan. The FibroScan can be repeated annually or as often as clinically indicated to assess for fibrosis progression and/or regression. Screening for hepatocellular carcinoma  HCC screening in patients with HBV should be performed with ultrasound on an annual basis in patients with inactive HBV who are under the age of 48 years. This was ordered today. Treatment of other medical problems in patients with chronic liver disease  There are no contraindications for the patient to take most medications necessary for treatment of other medical issues. The patient can take any medications utilized for treatment of DM and/or statins to treat hypercholesterolemia. The patient does not consume alcohol on a daily basis. Normal doses of acetaminophen, as recommended on the label of the bottle, are not hepatotoxic except in the setting of daily alcohol use, even in patients with cirrhosis and can be utilized for pain. Counseling for alcohol in patients with chronic liver disease  The patient was counseled regarding alcohol consumption and the effect of alcohol on chronic liver disease.     The patient does not consume any significant amount of alcohol. Vaccinations   He has documented immunity to viral hepatitis A. Routine vaccinations against other bacterial and viral agents can be performed as indicated. Annual flu vaccination should be administered if indicated. No Known Allergies    No current outpatient medications on file prior to visit. No current facility-administered medications on file prior to visit. FAMILY HISTORY:  Mom living with diabetes. It is unknown if she has HBV. The patient does not know his father's medical history. There is no family history of liver disease. There is no family history of immune disorders. SOCIAL HISTORY:  The patient is single. The patient has no children. The patient has never used tobacco products. The patient has never consumed significant amounts of alcohol. The patient currently works full time as an [employer] . He is also a member of the [employer]. PHYSICAL EXAMINATION:  Visit Vitals  /77 (BP 1 Location: Right upper arm, BP Patient Position: Sitting, BP Cuff Size: Adult)   Pulse 77   Temp (!) 96.7 °F (35.9 °C) (Temporal)   Resp 16   Ht 5' 7\" (1.702 m)   Wt 157 lb (71.2 kg)   SpO2 99%   BMI 24.59 kg/m²     General: No acute distress. Eyes: Sclera anicteric. ENT: No oral lesions. Nodes: No adenopathy. Skin: No spider angiomata. No jaundice. No palmar erythema. Respiratory: Lungs clear to auscultation. Cardiovascular: Regular heart rate. No murmurs. No JVD. Abdomen: Soft non-tender. Liver size normal to percussion/palpation. Spleen not palpable. No obvious ascites. Extremities: No edema. No muscle wasting. No gross arthritic changes. Neurologic: Alert and oriented. Cranial nerves grossly intact. No asterixis.     LABORATORY STUDIES:  Liver [place] of [address] [place] [address] & Units 9/23/2021 6/17/2021   WBC 4.1 - 11.1 K/uL 4.6 3.8   HGB 12.1 - 17.0 g/dL 16.0 15.9    - 400 K/uL 213 222   INR 0.9 - 1.1   1.0 1.0 AST 15 - 37 U/L 45 (H) 44 (H)   ALT 12 - 78 U/L 65 46 (H)   Alk Phos 45 - 117 U/L 103 100   Bili, Total 0.2 - 1.0 MG/DL 0.6 0.5   Bili, Direct 0.0 - 0.2 MG/DL 0.2 0.14   Albumin 3.5 - 5.0 g/dL 4.8 5.1   BUN 6 - 20 MG/DL 9 8   Creat 0.70 - 1.30 MG/DL 1.07 1.23   Na 136 - 145 mmol/L 136 139   K 3.5 - 5.1 mmol/L 4.1 4.3   Cl 97 - 108 mmol/L 103 101   CO2 21 - 32 mmol/L 29 26   Glucose 65 - 100 mg/dL 85 91     SEROLOGIES:  Virology Latest Ref Rng & Units 6/17/2021 6/17/2021   HBV DNA IU/mL 361,000,000 See Final Results     Serologies Latest Ref Rng & Units 6/17/2021 6/1/2021   Hep A Ab, Total Negative Positive (A)    Hep B Surface Ag Index  909.65   Hep B Surface Ag Interp Negative    Positive (A)   Hep B Core Ab, Total Negative Positive (A)    Hep B Surface AB QL  Reactive    Hep C Ab NONREACTIVE    NONREACTIVE   Hep D AB Negative Negative      LIVER HISTOLOGY:  9/2021. FibroScan performed at [facility]. EkPa was 5.4. IQR/med 4%. . The results suggested a fibrosis level of F0. The CAP score suggests fatty liver. ENDOSCOPIC PROCEDURES:  Not available or performed    RADIOLOGY:  9/27/2021. Abdominal ultrasound. No masses or lesions. OTHER TESTING:  Not available or performed    FOLLOW-UP:  All of the issues listed above in the assessment and plan were discussed with the patient. All questions were answered. The patient expressed a clear understanding of the above. [address] in 6 months. I will review results over MyChart.      April Ivan, [facility] PhoenixP-BC  Liver [facility] [address] [employer] [place], [address] Raul Daniel  22. [phone]

## 2022-03-24 NOTE — PROGRESS NOTES
Identified pt with two pt identifiers(name and ). Reviewed record in preparation for visit and have obtained necessary documentation. Chief Complaint   Patient presents with    Hepatitis B     6 mo f/u      Vitals:    22 1023   BP: 112/77   Pulse: 77   Resp: 16   Temp: (!) 96.7 °F (35.9 °C)   TempSrc: Temporal   SpO2: 99%   Weight: 157 lb (71.2 kg)   Height: 5' 7\" (1.702 m)   PainSc:   0 - No pain       Health Maintenance Review: Patient reminded of \"due or due soon\" health maintenance. I have asked the patient to contact his/her primary care provider (PCP) for follow-up on his/her health maintenance. Coordination of Care Questionnaire:  :   1) Have you been to an emergency room, urgent care, or hospitalized since your last visit? If yes, where when, and reason for visit? no       2. Have seen or consulted any other health care provider since your last visit? If yes, where when, and reason for visit? NO      Patient is accompanied by self I have received verbal consent from Carlos Hammond to discuss any/all medical information while they are present in the room.

## 2022-03-25 LAB
AFP L3 MFR SERPL: NORMAL % (ref 0–9.9)
AFP SERPL-MCNC: 2.6 NG/ML (ref 0–5.7)

## 2022-03-26 LAB
HBV DNA SERPL NAA+PROBE-ACNC: NORMAL IU/ML
HBV IU/ML: NORMAL IU/ML
LOG10 HBV AS IU/ML 551596: 8.8 LOG10 IU/ML
TEST INFORMATION: NORMAL

## 2022-09-23 ENCOUNTER — HOSPITAL ENCOUNTER (OUTPATIENT)
Dept: ULTRASOUND IMAGING | Age: 27
Discharge: HOME | End: 2022-09-23
Attending: NURSE PRACTITIONER
Payer: OTHER GOVERNMENT

## 2022-09-23 DIAGNOSIS — B19.10 HEP B W/O COMA: ICD-10-CM

## 2022-09-23 PROCEDURE — 76700 US EXAM ABDOM COMPLETE: CPT

## 2022-09-27 NOTE — PROGRESS NOTES
Pt notified via [address] letter of stable findings, follow-up as scheduled in 10 days for in person review/labs. detailed exam PERRL/conjunctiva clear/EOMI

## 2022-10-06 ENCOUNTER — OFFICE VISIT (OUTPATIENT)
Dept: HEMATOLOGY | Age: 27
End: 2022-10-06
Payer: OTHER GOVERNMENT

## 2022-10-06 VITALS
TEMPERATURE: 97.9 F | WEIGHT: 151.4 LBS | HEIGHT: 67 IN | BODY MASS INDEX: 23.76 KG/M2 | OXYGEN SATURATION: 99 % | SYSTOLIC BLOOD PRESSURE: 123 MMHG | HEART RATE: 73 BPM | DIASTOLIC BLOOD PRESSURE: 88 MMHG

## 2022-10-06 DIAGNOSIS — B19.10 HEP B W/O COMA: Primary | ICD-10-CM

## 2022-10-06 PROCEDURE — 91200 LIVER ELASTOGRAPHY: CPT | Performed by: PHYSICIAN ASSISTANT

## 2022-10-06 PROCEDURE — 99214 OFFICE O/P EST MOD 30 MIN: CPT | Performed by: PHYSICIAN ASSISTANT

## 2022-10-06 NOTE — PROGRESS NOTES
Identified pt with two pt identifiers(name and ). Reviewed record in preparation for visit and have obtained necessary documentation. Chief Complaint   Patient presents with    Hepatitis B     FS 6month follow up with Kimmy Rodriguez:    10/06/22 1337   BP: 123/88   Pulse: 73   Temp: 97.9 °F (36.6 °C)   TempSrc: Temporal   SpO2: 99%   Weight: 151 lb 6.4 oz (68.7 kg)   Height: 5' 7\" (1.702 m)   PainSc:   0 - No pain       Health Maintenance Review: Patient reminded of \"due or due soon\" health maintenance. I have asked the patient to contact his/her primary care provider (PCP) for follow-up on his/her health maintenance. Coordination of Care Questionnaire:  :   1) Have you been to an emergency room, urgent care, or hospitalized since your last visit? If yes, where when, and reason for visit? no       2. Have seen or consulted any other health care provider since your last visit? If yes, where when, and reason for visit? NO      Patient is accompanied by self I have received verbal consent from Wayne Crowe to discuss any/all medical information while they are present in the room.

## 2022-10-06 NOTE — PROGRESS NOTES
3340 [facility], [place], [address], [place], [place]      JIMENA Moreno, [facility]-BC   Juwan Lerma, JENNIFER-   Abiola Guillermo, EMMA-[place], FNP-C    Pawan Cobian, [facility]-BC       Kandice Fox Andrea [address] 136    at [address], [facility] Raul Daniel  22.    [phone]    FAX: [address] [place] [address]    at [address], [address]    [phone]    FAX: [phone]     Patient Care Team:  Telma Arreola MD as PCP - General (Family Medicine)  Telma Arreola MD as PCP - [facility] Provider    Patient Active Problem List   Diagnosis Code    Hep B w/o coma B19.10     Anna Shaikh is being seen at [facility] for management of chronic HBV. The active problem list, all pertinent past medical history, medications and laboratory findings related to the liver disorder were reviewed and discussed with the patient. The patient is a 32 y.o. Black who has tested positive for HBV in 2021 while having an STD panel performed due to possible exposure. Risk factors for acquiring HBV are [place]. There was an episode of acute icteric hepatitis around 2011. He is not aware of any other family members with HBV and there is no known family history of [address] An assessment of liver fibrosis with elastography ws performed one year ago suggesting no fibrosis. This is planned for repeat assessment in the office today. The patient has never received treatment for chronic HBV. The patient has no symptoms which can be attributed to the liver disorder.     The patient is not currently experiencing the following symptoms of liver disease: pain in the right side over the liver, yellowing of the eyes or skin, dark urine or swelling of the abdomen. The patient completes all daily activities without any functional limitations. ASSESSMENT AND PLAN:  Chronic HBV  The patient is not currently receiving treatment for HBV. The patient has no fibrosis on past fibroscan. This is confirmed on today's study. He has a high viral load at >600,000,000. His ALT is twice ULN for HBV. We discussed treatment again today. I have reviewed with him that he meets criteria on the basis of the viral load and ALT to initiate treatment even in the absence of fibrosis. Treatment would be expected to bring viral load down with suppression which would normalize liver enzymes and reduce risk for liver cancer development. We would plan on following eAg status for possible seroconversion as well. He is agreeable to starting treatment if current labs continue to show elevation in liver enzymes and I will inform him of these findings as available and order medication as appropriate. I have explained to him the administration and low potential for side effects with HBV medications. I advised the patient he is potentially contagious to others and to vaccinate any household contacts and practice safe sex every single time. He needs to let any future partner know about his active infection. Will perform laboratory testing to monitor liver function and degree of liver injury. This includes BMP, hepatic panel, CBC with platelet count and AFP. The FibroScan can be repeated annually or as often as clinically indicated to assess for fibrosis progression and/or regression. Screening for hepatocellular carcinoma  HCC screening in patients with HBV should be performed with ultrasound on an annual basis in patients with inactive HBV who are under the age of 48 years. This was recently done in 9/2022 and is due for repeat in 9/2023. I have reviewed these negative findings with the patient in detail.      Treatment of other medical problems in patients with chronic liver disease  There are no contraindications for the patient to take most medications necessary for treatment of other medical issues. The patient does not consume alcohol on a daily basis. Normal doses of acetaminophen, as recommended on the label of the bottle, are not hepatotoxic except in the setting of daily alcohol use, even in patients with cirrhosis and can be utilized for pain. Counseling for alcohol in patients with chronic liver disease  The patient was counseled regarding alcohol consumption and the effect of alcohol on chronic liver disease. The patient does not consume any significant amount of alcohol. Vaccinations   He has documented immunity to viral hepatitis A. Routine vaccinations against other bacterial and viral agents can be performed as indicated. Annual flu vaccination should be administered if indicated. No Known Allergies    No current outpatient medications on file prior to visit. No current facility-administered medications on file prior to visit. FAMILY HISTORY:  Mom living with diabetes. It is unknown if she has HBV. The patient does not know his father's medical history. There is no family history of liver disease. There is no family history of immune disorders. SOCIAL HISTORY:  The patient is single. The patient has no children. The patient has never used tobacco products. The patient has never consumed significant amounts of alcohol. The patient currently works full time as a [employer] . He is also a member of the [employer]. PHYSICAL EXAMINATION:  Visit Vitals  /88 (BP 1 Location: Right arm, BP Patient Position: Sitting, BP Cuff Size: Adult)   Pulse 73   Temp 97.9 °F (36.6 °C) (Temporal)   Ht 5' 7\" (1.702 m)   Wt 151 lb 6.4 oz (68.7 kg)   SpO2 99%   BMI 23.71 kg/m²     General: No acute distress. Eyes: Sclera anicteric. ENT: No oral lesions. Nodes: No adenopathy. Skin: No spider angiomata. No jaundice. No palmar erythema. Respiratory: Lungs clear to auscultation. Cardiovascular: Regular heart rate. No murmurs. No JVD. Abdomen: Soft non-tender. Liver size normal to percussion/palpation. Spleen not palpable. No obvious ascites. Extremities: No edema. No muscle wasting. No gross arthritic changes. Neurologic: Alert and oriented. Cranial nerves grossly intact. No asterixis.     LABORATORY STUDIES:  [address] & Units 3/24/2022 9/23/2021   WBC 4.1 - 11.1 K/uL 3.8 (L) 4.6   HGB 12.1 - 17.0 g/dL 15.7 16.0    - 400 K/uL 195 213   INR 0.9 - 1.1   1.0 1.0   AST 15 - 37 U/L 33 45 (H)   ALT 12 - 78 U/L 66 65   Alk Phos 45 - 117 U/L 89 103   Bili, Total 0.2 - 1.0 MG/DL 0.4 0.6   Bili, Direct 0.0 - 0.2 MG/DL 0.1 0.2   Albumin 3.5 - 5.0 g/dL 4.8 4.8   BUN 6 - 20 MG/DL 9 9   Creat 0.70 - 1.30 MG/DL 1.02 1.07   Na 136 - 145 mmol/L 137 136   K 3.5 - 5.1 mmol/L 4.2 4.1   Cl 97 - 108 mmol/L 104 103   CO2 21 - 32 mmol/L 27 29   Glucose 65 - 100 mg/dL 92 85   Cancer Screening Latest Ref Rng & Units 3/24/2022 9/23/2021   AFP, Serum 0.0 - 5.7 ng/mL 2.6 2.6   AFP-L3% 0.0 - 9.9 % Comment Comment     Liver [facility] Latest Ref Rng & Units 6/17/2021   WBC 4.1 - 11.1 K/uL 3.8   HGB 12.1 - 17.0 g/dL 15.9    - 400 K/uL 222   INR 0.9 - 1.1   1.0   AST 15 - 37 U/L 44 (H)   ALT 12 - 78 U/L 46 (H)   Alk Phos 45 - 117 U/L 100   Bili, Total 0.2 - 1.0 MG/DL 0.5   Bili, Direct 0.0 - 0.2 MG/DL 0.14   Albumin 3.5 - 5.0 g/dL 5.1   BUN 6 - 20 MG/DL 8   Creat 0.70 - 1.30 MG/DL 1.23   Na 136 - 145 mmol/L 139   K 3.5 - 5.1 mmol/L 4.3   Cl 97 - 108 mmol/L 101   CO2 21 - 32 mmol/L 26   Glucose 65 - 100 mg/dL 91   Cancer Screening Latest Ref Rng & Units    AFP, Serum 0.0 - 5.7 ng/mL    AFP-L3% 0.0 - 9.9 %      Virology Latest Ref Rng & Units 3/24/2022 3/24/2022 9/23/2021   HBV DNA IU/mL 633,000,000 See Final Results 286,000,000   Additional lab values drawn at today's office visit are pending at the time of documentation. SEROLOGIES:    Serologies Latest Ref Rng & Units 6/17/2021 6/1/2021   Hep A Ab, Total Negative Positive (A)    Hep B Surface Ag Index  909.65   Hep B Surface Ag Interp Negative    Positive (A)   Hep B Core Ab, Total Negative Positive (A)    Hep B Surface AB QL  Reactive    Hep C Ab NONREACTIVE    NONREACTIVE   Hep D AB Negative Negative    2021. Hep Be Ag pos, Hep Be Ab pos    LIVER HISTOLOGY:  9/2021. FibroScan performed at [facility]. EkPa was 5.4. IQR/med 4%. . The results suggested a fibrosis level of F0. The CAP score suggests fatty liver. 10/2022. FibroScan performed at [facility]. EkPa was 4.6. Suggested fibrosis level is F0-1. CAP score is 212, no steatosis. ENDOSCOPIC PROCEDURES:  Not available or performed    RADIOLOGY:  9/27/2021. Abdominal ultrasound. No masses or lesions. 9/2022. Ultrasound of liver. Echogenic consistent with chronic liver disease. No liver mass lesions. No dilated bile ducts. No ascites. OTHER TESTING:  Not available or performed    FOLLOW-UP:  All of the issues listed above in the assessment and plan were discussed with the patient. All questions were answered. The patient expressed a clear understanding of the above. [facility] [place] [address] in 6 months. I will review pending lab results via phone when available and plan likely start of Vemlidy.       Bekah Byrd PA-C  Liver [facility], [address] [facility] 22.  [phone]  [address] [place] [address]

## 2022-10-07 LAB
ALBUMIN SERPL-MCNC: 4.5 G/DL (ref 3.5–5)
ALBUMIN/GLOB SERPL: 1.4 {RATIO} (ref 1.1–2.2)
ALP SERPL-CCNC: 92 U/L (ref 45–117)
ALT SERPL-CCNC: 55 U/L (ref 12–78)
AST SERPL-CCNC: 34 U/L (ref 15–37)
BILIRUB DIRECT SERPL-MCNC: <0.1 MG/DL (ref 0–0.2)
BILIRUB SERPL-MCNC: 0.6 MG/DL (ref 0.2–1)
ERYTHROCYTE [DISTWIDTH] IN BLOOD BY AUTOMATED COUNT: 13.2 % (ref 11.5–14.5)
GLOBULIN SER CALC-MCNC: 3.3 G/DL (ref 2–4)
HCT VFR BLD AUTO: 50.8 % (ref 36.6–50.3)
HGB BLD-MCNC: 16 G/DL (ref 12.1–17)
MCH RBC QN AUTO: 25.9 PG (ref 26–34)
MCHC RBC AUTO-ENTMCNC: 31.5 G/DL (ref 30–36.5)
MCV RBC AUTO: 82.3 FL (ref 80–99)
NRBC # BLD: 0 K/UL (ref 0–0.01)
NRBC BLD-RTO: 0 PER 100 WBC
PLATELET # BLD AUTO: 194 K/UL (ref 150–400)
PMV BLD AUTO: 11.9 FL (ref 8.9–12.9)
PROT SERPL-MCNC: 7.8 G/DL (ref 6.4–8.2)
RBC # BLD AUTO: 6.17 M/UL (ref 4.1–5.7)
WBC # BLD AUTO: 4.5 K/UL (ref 4.1–11.1)

## 2022-10-08 LAB
HBV DNA SERPL NAA+PROBE-ACNC: NORMAL IU/ML
HBV IU/ML: NORMAL IU/ML
LOG10 HBV AS IU/ML 551596: 8.75 LOG10 IU/ML
TEST INFORMATION: NORMAL

## 2022-10-11 LAB
AFP L3 MFR SERPL: NORMAL % (ref 0–9.9)
AFP SERPL-MCNC: 2.7 NG/ML (ref 0–5.7)

## 2022-10-12 RX ORDER — TENOFOVIR ALAFENAMIDE 25 MG/1
25 TABLET ORAL DAILY
Qty: 30 TABLET | Refills: 5 | Status: SHIPPED | OUTPATIENT
Start: 2022-10-12 | End: 2022-10-17 | Stop reason: SDUPTHER

## 2022-10-13 NOTE — PROGRESS NOTES
Pt notified via [address] letter of results indicating we should proceed with medical therapy on the basis of the elevated ALT and high viral titer. Will send prescription to pharmacy and have patient follow-up as scheduled for response.

## 2022-10-17 RX ORDER — TENOFOVIR ALAFENAMIDE 25 MG/1
25 TABLET ORAL DAILY
Qty: 30 TABLET | Refills: 5 | Status: SHIPPED | OUTPATIENT
Start: 2022-10-17 | End: 2022-10-18 | Stop reason: SDUPTHER

## 2022-10-18 RX ORDER — TENOFOVIR ALAFENAMIDE 25 MG/1
25 TABLET ORAL DAILY
Qty: 30 TABLET | Refills: 5 | Status: SHIPPED | OUTPATIENT
Start: 2022-10-18

## 2023-04-23 DIAGNOSIS — B19.10 HEP B W/O COMA: Primary | ICD-10-CM

## 2023-05-23 RX ORDER — TENOFOVIR ALAFENAMIDE 25 MG/1
TABLET ORAL
COMMUNITY
Start: 2023-04-10

## 2023-10-19 ENCOUNTER — HOSPITAL ENCOUNTER (OUTPATIENT)
Facility: HOSPITAL | Age: 28
Discharge: HOME | End: 2023-10-19
Payer: OTHER GOVERNMENT

## 2023-10-19 ENCOUNTER — OFFICE VISIT (OUTPATIENT)
Age: 28
End: 2023-10-19
Payer: OTHER GOVERNMENT

## 2023-10-19 VITALS
HEART RATE: 72 BPM | HEIGHT: 67 IN | OXYGEN SATURATION: 99 % | BODY MASS INDEX: 24.86 KG/M2 | DIASTOLIC BLOOD PRESSURE: 88 MMHG | RESPIRATION RATE: 16 BRPM | WEIGHT: 158.4 LBS | TEMPERATURE: 98 F | SYSTOLIC BLOOD PRESSURE: 129 MMHG

## 2023-10-19 DIAGNOSIS — B19.10 HEP B W/O COMA: ICD-10-CM

## 2023-10-19 DIAGNOSIS — B18.1 CHRONIC VIRAL HEPATITIS B WITHOUT DELTA AGENT AND WITHOUT COMA (HCC): Primary | ICD-10-CM

## 2023-10-19 PROCEDURE — 99213 OFFICE O/P EST LOW 20 MIN: CPT | Performed by: PHYSICIAN ASSISTANT

## 2023-10-19 PROCEDURE — 76705 ECHO EXAM OF ABDOMEN: CPT

## 2023-10-19 ASSESSMENT — PATIENT HEALTH QUESTIONNAIRE - PHQ9
SUM OF ALL RESPONSES TO PHQ QUESTIONS 1-9: 0
2. FEELING DOWN, DEPRESSED OR HOPELESS: 0
SUM OF ALL RESPONSES TO PHQ QUESTIONS 1-9: 0
SUM OF ALL RESPONSES TO PHQ QUESTIONS 1-9: 0
1. LITTLE INTEREST OR PLEASURE IN DOING THINGS: 0
SUM OF ALL RESPONSES TO PHQ QUESTIONS 1-9: 0
SUM OF ALL RESPONSES TO PHQ9 QUESTIONS 1 & 2: 0

## 2023-10-19 NOTE — PROGRESS NOTES
MD Nevaeh, [place], [place]      MARTHA Fragoso, [facility]   Trent Carolina, [facility]-   Nereida Hawkins, JASON-OTTO Leon, FNP-OTTO    Yolistammy Piedra, [facility] HospitalNP-BC       [facility]    at [facility]    [address] [facility], 100 [facility], [address]    [phone]    FAX: [address] [address], 230 [facility], [address]    [phone]    FAX: [phone]     Patient Care Team:  Georges Albert MD as PCP - General  Georges Albert MD as PCP - Empaneled Provider    Patient Active Problem List   Diagnosis    Hep B w/o yusra Pagan is being seen at [facility] for management of chronic HBV. The active problem list, all pertinent past medical history, medications and laboratory findings related to the liver disorder were reviewed and discussed with the patient. The patient is a 29 y.o. Black who has tested positive for HBV in 2021 while having an STD panel performed due to possible exposure. Risk factors for acquiring HBV are immigration from [place]. There was an episode of acute icteric hepatitis around 2011. He is not aware of any other family members with HBV and there is no known family history of [address] [facility]. An assessment of liver fibrosis with elastography was performed in 9/2021 and 10/2022 and there was no suggestion of fibrosis. At the time of his office visit in 10/2022, we had elected to start him on a course of Vemlidy due to the presence of elevation in liver enzymes and high viral load. He has tolerated this medication well. He reports no side effects of the medication and has been receiving from the pharmacy without difficulty. He has had no major missed dosing.      The patient

## 2023-10-20 LAB
ALBUMIN SERPL-MCNC: 5 G/DL (ref 3.5–5)
ALBUMIN/GLOB SERPL: 1.6 (ref 1.1–2.2)
ALP SERPL-CCNC: 82 U/L (ref 45–117)
ALT SERPL-CCNC: 94 U/L (ref 12–78)
ANION GAP SERPL CALC-SCNC: 7 MMOL/L (ref 5–15)
AST SERPL-CCNC: 40 U/L (ref 15–37)
BILIRUB DIRECT SERPL-MCNC: 0.1 MG/DL (ref 0–0.2)
BILIRUB SERPL-MCNC: 0.6 MG/DL (ref 0.2–1)
BUN SERPL-MCNC: 12 MG/DL (ref 6–20)
BUN/CREAT SERPL: 11 (ref 12–20)
CALCIUM SERPL-MCNC: 10.1 MG/DL (ref 8.5–10.1)
CHLORIDE SERPL-SCNC: 103 MMOL/L (ref 97–108)
CO2 SERPL-SCNC: 29 MMOL/L (ref 21–32)
CREAT SERPL-MCNC: 1.14 MG/DL (ref 0.7–1.3)
ERYTHROCYTE [DISTWIDTH] IN BLOOD BY AUTOMATED COUNT: 13 % (ref 11.5–14.5)
GLOBULIN SER CALC-MCNC: 3.2 G/DL (ref 2–4)
GLUCOSE SERPL-MCNC: 88 MG/DL (ref 65–100)
HCT VFR BLD AUTO: 49.4 % (ref 36.6–50.3)
HGB BLD-MCNC: 15.4 G/DL (ref 12.1–17)
MCH RBC QN AUTO: 25.9 PG (ref 26–34)
MCHC RBC AUTO-ENTMCNC: 31.2 G/DL (ref 30–36.5)
MCV RBC AUTO: 83 FL (ref 80–99)
NRBC # BLD: 0 K/UL (ref 0–0.01)
NRBC BLD-RTO: 0 PER 100 WBC
PLATELET # BLD AUTO: 205 K/UL (ref 150–400)
PMV BLD AUTO: 11.7 FL (ref 8.9–12.9)
POTASSIUM SERPL-SCNC: 4 MMOL/L (ref 3.5–5.1)
PROT SERPL-MCNC: 8.2 G/DL (ref 6.4–8.2)
RBC # BLD AUTO: 5.95 M/UL (ref 4.1–5.7)
SODIUM SERPL-SCNC: 139 MMOL/L (ref 136–145)
WBC # BLD AUTO: 4.5 K/UL (ref 4.1–11.1)

## 2023-10-21 LAB
HBV DNA SERPL NAA+PROBE-ACNC: 7150 IU/ML
HBV DNA SERPL NAA+PROBE-LOG IU: 3.85 LOG10 IU/ML
TEST INFORMATION: NORMAL

## 2023-10-25 LAB
AFP L3 MFR SERPL: NORMAL % (ref 0–9.9)
AFP SERPL-MCNC: 3 NG/ML (ref 0–5.7)

## 2023-10-26 LAB — HDV AB SER QL IA: NEGATIVE

## 2024-03-25 RX ORDER — TENOFOVIR ALAFENAMIDE 25 MG/1
TABLET ORAL
Qty: 30 TABLET | Refills: 11 | Status: ACTIVE | OUTPATIENT
Start: 2024-03-25

## 2024-04-25 ENCOUNTER — OFFICE VISIT (OUTPATIENT)
Age: 29
End: 2024-04-25
Payer: OTHER GOVERNMENT

## 2024-04-25 VITALS
DIASTOLIC BLOOD PRESSURE: 90 MMHG | RESPIRATION RATE: 17 BRPM | SYSTOLIC BLOOD PRESSURE: 152 MMHG | TEMPERATURE: 97.6 F | OXYGEN SATURATION: 97 % | HEIGHT: 68 IN | WEIGHT: 166.4 LBS | HEART RATE: 131 BPM | BODY MASS INDEX: 25.22 KG/M2

## 2024-04-25 DIAGNOSIS — B18.1 CHRONIC VIRAL HEPATITIS B WITHOUT DELTA AGENT AND WITHOUT COMA (HCC): Primary | ICD-10-CM

## 2024-04-25 LAB
ALBUMIN SERPL-MCNC: 4.5 G/DL (ref 3.5–5)
ALBUMIN/GLOB SERPL: 1.2 (ref 1.1–2.2)
ALP SERPL-CCNC: 88 U/L (ref 45–117)
ALT SERPL-CCNC: 144 U/L (ref 12–78)
ANION GAP SERPL CALC-SCNC: 6 MMOL/L (ref 5–15)
AST SERPL-CCNC: 59 U/L (ref 15–37)
BILIRUB DIRECT SERPL-MCNC: 0.2 MG/DL (ref 0–0.2)
BILIRUB SERPL-MCNC: 0.5 MG/DL (ref 0.2–1)
BUN SERPL-MCNC: 13 MG/DL (ref 6–20)
BUN/CREAT SERPL: 11 (ref 12–20)
CALCIUM SERPL-MCNC: 10.3 MG/DL (ref 8.5–10.1)
CHLORIDE SERPL-SCNC: 104 MMOL/L (ref 97–108)
CO2 SERPL-SCNC: 30 MMOL/L (ref 21–32)
CREAT SERPL-MCNC: 1.22 MG/DL (ref 0.7–1.3)
ERYTHROCYTE [DISTWIDTH] IN BLOOD BY AUTOMATED COUNT: 13.5 % (ref 11.5–14.5)
GGT SERPL-CCNC: 132 U/L (ref 15–85)
GLOBULIN SER CALC-MCNC: 3.7 G/DL (ref 2–4)
GLUCOSE SERPL-MCNC: 98 MG/DL (ref 65–100)
HCT VFR BLD AUTO: 49.7 % (ref 36.6–50.3)
HGB BLD-MCNC: 16.1 G/DL (ref 12.1–17)
MCH RBC QN AUTO: 26.3 PG (ref 26–34)
MCHC RBC AUTO-ENTMCNC: 32.4 G/DL (ref 30–36.5)
MCV RBC AUTO: 81.2 FL (ref 80–99)
NRBC # BLD: 0 K/UL (ref 0–0.01)
NRBC BLD-RTO: 0 PER 100 WBC
PLATELET # BLD AUTO: 211 K/UL (ref 150–400)
PMV BLD AUTO: 11.8 FL (ref 8.9–12.9)
POTASSIUM SERPL-SCNC: 4 MMOL/L (ref 3.5–5.1)
PROT SERPL-MCNC: 8.2 G/DL (ref 6.4–8.2)
RBC # BLD AUTO: 6.12 M/UL (ref 4.1–5.7)
SODIUM SERPL-SCNC: 140 MMOL/L (ref 136–145)
WBC # BLD AUTO: 5.1 K/UL (ref 4.1–11.1)

## 2024-04-25 PROCEDURE — 99213 OFFICE O/P EST LOW 20 MIN: CPT | Performed by: PHYSICIAN ASSISTANT

## 2024-04-25 RX ORDER — TENOFOVIR ALAFENAMIDE 25 MG/1
TABLET ORAL
Qty: 90 TABLET | Refills: 3 | Status: ACTIVE | OUTPATIENT
Start: 2024-04-25

## 2024-04-25 NOTE — PROGRESS NOTES
Room:   I have reviewed all needed documentation in preparation for visit. Verified patient by name and date of birth  Eugene Swain is a 29 y.o. male Follow-up  .  Vitals:    04/25/24 1013   BP: (!) 152/90   Pulse: (!) 131   Resp: 17   Temp: 97.6 °F (36.4 °C)   SpO2: 97%       Patient states that he has no concerns today.   Patient denies any symptoms of HTN.     Health Maintenance Review: Patient reminded of \"due or due soon\" health maintenance. I have asked the patient to contact his/her primary care provider (PCP) for follow-up on his/her health maintenance.    \"Have you been to the ER, urgent care clinic since your last visit?  Hospitalized since your last visit?\"    NO    “Have you seen or consulted any other health care providers outside of [facility] since your last visit?”    NO    
repeat at present.     Screening for hepatocellular carcinoma  HCC screening in patients with HBV should be performed with ultrasound on an annual basis in patients with inactive HBV who are under the age of 50 years. This was last done 10/2023 without new findings and I will order for repeat in Fall 2024 office visit.     Treatment of other medical problems in patients with chronic liver disease  There are no contraindications for the patient to take most medications necessary for treatment of other medical issues.    The patient does not consume alcohol on a daily basis. Normal doses of acetaminophen, as recommended on the label of the bottle, are not hepatotoxic except in the setting of daily alcohol use, even in patients with cirrhosis and can be utilized for pain.    Counseling for alcohol in patients with chronic liver disease  The patient was counseled regarding alcohol consumption and the effect of alcohol on chronic liver disease.    The patient does not consume any significant amount of alcohol.    Vaccinations   He has documented immunity to viral hepatitis A. Routine vaccinations against other bacterial and viral agents can be performed as indicated. Annual flu vaccination should be administered if indicated.    ALLERGIES:  No Known Allergies    MEDICATIONS:  Current Outpatient Medications   Medication Instructions    Tenofovir Alafenamide Fumarate (VEMLIDY) 25 MG TABS TAKE 1 TABLET DAILY FOR CHRONIC HEPATITIS B     FAMILY HISTORY:  Mom living with diabetes. It is unknown if she has HBV.  The patient does not know his father's medical history.  There is no family history of liver disease.    There is no family history of immune disorders.    SOCIAL HISTORY:  The patient is single.   The patient has no children.     The patient has never used tobacco products.    The patient has never consumed significant amounts of alcohol.    The patient currently works full time as a [employer] . He is also

## 2024-04-29 LAB
AFP L3 MFR SERPL: NORMAL % (ref 0–9.9)
AFP SERPL-MCNC: 3.3 NG/ML (ref 0–5.7)

## 2024-04-30 DIAGNOSIS — R74.8 ELEVATED LIVER ENZYMES: Primary | ICD-10-CM

## 2024-04-30 LAB
HBV DNA SERPL NAA+PROBE-ACNC: 350 IU/ML
HBV DNA SERPL NAA+PROBE-LOG IU: 2.54 LOG10 IU/ML
TEST INFORMATION: NORMAL

## 2024-05-01 ENCOUNTER — CLINICAL DOCUMENTATION (OUTPATIENT)
Age: 29
End: 2024-05-01

## 2024-05-08 LAB
A1AT SERPL-MCNC: 122 MG/DL (ref 95–164)
ALBUMIN SERPL-MCNC: 5 G/DL (ref 4.3–5.2)
ALP SERPL-CCNC: 84 IU/L (ref 44–121)
ALT SERPL-CCNC: 70 IU/L (ref 0–44)
AST SERPL-CCNC: 40 IU/L (ref 0–40)
BILIRUB DIRECT SERPL-MCNC: <0.1 MG/DL (ref 0–0.4)
BILIRUB SERPL-MCNC: 0.2 MG/DL (ref 0–1.2)
CERULOPLASMIN SERPL-MCNC: 27.4 MG/DL (ref 16–31)
FERRITIN SERPL-MCNC: 227 NG/ML (ref 30–400)
HCV IGG SERPL QL IA: NON REACTIVE
HIV 1+2 AB+HIV1 P24 AG SERPL QL IA: NON REACTIVE
IRON SATN MFR SERPL: 28 % (ref 15–55)
IRON SERPL-MCNC: 97 UG/DL (ref 38–169)
LKM-1 AB SER-ACNC: 0.9 UNITS (ref 0–20)
PROT SERPL-MCNC: 8 G/DL (ref 6–8.5)
SMA IGG SER-ACNC: 8 UNITS (ref 0–19)
TIBC SERPL-MCNC: 351 UG/DL (ref 250–450)
UIBC SERPL-MCNC: 254 UG/DL (ref 111–343)

## 2024-05-10 ENCOUNTER — TELEPHONE (OUTPATIENT)
Age: 29
End: 2024-05-10

## 2024-05-10 ENCOUNTER — CLINICAL DOCUMENTATION (OUTPATIENT)
Age: 29
End: 2024-05-10

## 2024-05-10 DIAGNOSIS — B18.1 CHRONIC VIRAL HEPATITIS B WITHOUT DELTA AGENT AND WITHOUT COMA (HCC): Primary | ICD-10-CM

## 2024-05-10 LAB
ANA SER QL IF: NEGATIVE
LABORATORY COMMENT REPORT: NORMAL

## 2024-08-22 RX ORDER — TENOFOVIR ALAFENAMIDE 25 MG/1
TABLET ORAL
Qty: 90 TABLET | Refills: 3 | Status: ACTIVE | OUTPATIENT
Start: 2024-08-22

## 2024-10-28 ENCOUNTER — OFFICE VISIT (OUTPATIENT)
Age: 29
End: 2024-10-28
Payer: OTHER GOVERNMENT

## 2024-10-28 ENCOUNTER — HOSPITAL ENCOUNTER (OUTPATIENT)
Facility: HOSPITAL | Age: 29
Discharge: HOME | End: 2024-10-31
Payer: OTHER GOVERNMENT

## 2024-10-28 VITALS
DIASTOLIC BLOOD PRESSURE: 102 MMHG | HEART RATE: 102 BPM | SYSTOLIC BLOOD PRESSURE: 150 MMHG | TEMPERATURE: 98.1 F | BODY MASS INDEX: 25.61 KG/M2 | HEIGHT: 68 IN | OXYGEN SATURATION: 98 % | WEIGHT: 169 LBS

## 2024-10-28 DIAGNOSIS — B18.1 CHRONIC VIRAL HEPATITIS B WITHOUT DELTA AGENT AND WITHOUT COMA (HCC): ICD-10-CM

## 2024-10-28 DIAGNOSIS — B18.1 CHRONIC VIRAL HEPATITIS B WITHOUT DELTA AGENT AND WITHOUT COMA (HCC): Primary | ICD-10-CM

## 2024-10-28 LAB
ALBUMIN SERPL-MCNC: 4.4 G/DL (ref 3.5–5)
ALBUMIN/GLOB SERPL: 1.2 (ref 1.1–2.2)
ALP SERPL-CCNC: 88 U/L (ref 45–117)
ALT SERPL-CCNC: 80 U/L (ref 12–78)
ANION GAP SERPL CALC-SCNC: 6 MMOL/L (ref 2–12)
AST SERPL-CCNC: 37 U/L (ref 15–37)
BILIRUB DIRECT SERPL-MCNC: 0.1 MG/DL (ref 0–0.2)
BILIRUB SERPL-MCNC: 0.6 MG/DL (ref 0.2–1)
BUN SERPL-MCNC: 12 MG/DL (ref 6–20)
BUN/CREAT SERPL: 9 (ref 12–20)
CALCIUM SERPL-MCNC: 10.1 MG/DL (ref 8.5–10.1)
CHLORIDE SERPL-SCNC: 106 MMOL/L (ref 97–108)
CO2 SERPL-SCNC: 29 MMOL/L (ref 21–32)
CREAT SERPL-MCNC: 1.29 MG/DL (ref 0.7–1.3)
ERYTHROCYTE [DISTWIDTH] IN BLOOD BY AUTOMATED COUNT: 12.9 % (ref 11.5–14.5)
GLOBULIN SER CALC-MCNC: 3.6 G/DL (ref 2–4)
GLUCOSE SERPL-MCNC: 97 MG/DL (ref 65–100)
HCT VFR BLD AUTO: 50.9 % (ref 36.6–50.3)
HGB BLD-MCNC: 16.1 G/DL (ref 12.1–17)
MCH RBC QN AUTO: 26 PG (ref 26–34)
MCHC RBC AUTO-ENTMCNC: 31.6 G/DL (ref 30–36.5)
MCV RBC AUTO: 82.2 FL (ref 80–99)
NRBC # BLD: 0 K/UL (ref 0–0.01)
NRBC BLD-RTO: 0 PER 100 WBC
PLATELET # BLD AUTO: 230 K/UL (ref 150–400)
PMV BLD AUTO: 12.2 FL (ref 8.9–12.9)
POTASSIUM SERPL-SCNC: 4.1 MMOL/L (ref 3.5–5.1)
PROT SERPL-MCNC: 8 G/DL (ref 6.4–8.2)
RBC # BLD AUTO: 6.19 M/UL (ref 4.1–5.7)
SODIUM SERPL-SCNC: 141 MMOL/L (ref 136–145)
WBC # BLD AUTO: 5 K/UL (ref 4.1–11.1)

## 2024-10-28 PROCEDURE — 76705 ECHO EXAM OF ABDOMEN: CPT

## 2024-10-28 PROCEDURE — 99213 OFFICE O/P EST LOW 20 MIN: CPT | Performed by: PHYSICIAN ASSISTANT

## 2024-10-28 ASSESSMENT — PATIENT HEALTH QUESTIONNAIRE - PHQ9
SUM OF ALL RESPONSES TO PHQ9 QUESTIONS 1 & 2: 0
DEPRESSION UNABLE TO ASSESS: FUNCTIONAL CAPACITY MOTIVATION LIMITS ACCURACY
1. LITTLE INTEREST OR PLEASURE IN DOING THINGS: NOT AT ALL
SUM OF ALL RESPONSES TO PHQ QUESTIONS 1-9: 0
2. FEELING DOWN, DEPRESSED OR HOPELESS: NOT AT ALL
SUM OF ALL RESPONSES TO PHQ QUESTIONS 1-9: 0

## 2024-10-28 ASSESSMENT — ANXIETY QUESTIONNAIRES
6. BECOMING EASILY ANNOYED OR IRRITABLE: NOT AT ALL
4. TROUBLE RELAXING: NOT AT ALL
1. FEELING NERVOUS, ANXIOUS, OR ON EDGE: NOT AT ALL
GAD7 TOTAL SCORE: 0
IF YOU CHECKED OFF ANY PROBLEMS ON THIS QUESTIONNAIRE, HOW DIFFICULT HAVE THESE PROBLEMS MADE IT FOR YOU TO DO YOUR WORK, TAKE CARE OF THINGS AT HOME, OR GET ALONG WITH OTHER PEOPLE: NOT DIFFICULT AT ALL
2. NOT BEING ABLE TO STOP OR CONTROL WORRYING: NOT AT ALL
5. BEING SO RESTLESS THAT IT IS HARD TO SIT STILL: NOT AT ALL
7. FEELING AFRAID AS IF SOMETHING AWFUL MIGHT HAPPEN: NOT AT ALL
3. WORRYING TOO MUCH ABOUT DIFFERENT THINGS: NOT AT ALL

## 2024-10-28 NOTE — PROGRESS NOTES
[facility]      Dayron Russo MD, FACP, FACG, FAASLD      MARTHA Mcguire, [facility]   Rubi Rebecca, [facility]   April Chaudhary, FNP-C   Zhao Adrian, P-C    Frances Dominguez, [facility]    at [facility]    [address], [address]    [place]  [address] [phone]    FAX: [phone]   [facility]    at [facility]    [address], [address]    [place]  [address] [phone]    FAX: [phone]     Patient Care Team:  Andrea Dean MD as PCP - General    Patient Active Problem List   Diagnosis    Hep B w/o coma     Eugene Swain is being seen at [facility] for management of chronic HBV. The active problem list, all pertinent past medical history, medications and laboratory findings related to the liver disorder were reviewed and discussed with the patient.      The patient is a 29 y.o. Black who has tested positive for HBV in 2021 while having an STD panel performed due to possible exposure. Risk factors for acquiring HBV are immigration from [facility]. There was an episode of acute icteric hepatitis around 2011. He is not aware of any other family members with HBV and there is no known family history of HCC.     An assessment of liver fibrosis with elastography was performed in 9/2021 and 10/2022 and there was no suggestion of fibrosis.     At the time of his office visit in 10/2022, we had elected to start him on a course of Vemlidy due to the presence of elevation in liver enzymes and high viral load.  He has tolerated this medication well.  He reports no side effects of the medication and has been receiving from the pharmacy without difficulty. He has had no major missed dosing.     The patient has no symptoms which can be attributed to the

## 2024-10-28 NOTE — PROGRESS NOTES
Chief Complaint   Patient presents with    Follow-up     Vitals:    10/28/24 1029   BP: (!) 150/102   Pulse:    Temp:    SpO2:      \"Have you been to the ER, urgent care clinic since your last visit?  Hospitalized since your last visit?\"    NO    “Have you seen or consulted any other health care providers outside our system since your last visit?”    NO

## 2024-10-29 LAB
HBV DNA SERPL NAA+PROBE-ACNC: 320 IU/ML
HBV DNA SERPL NAA+PROBE-LOG IU: 2.5 LOG10 IU/ML
HBV E AB SERPL QL IA: NON REACTIVE
HBV E AG SERPL QL IA: POSITIVE
TEST INFORMATION: NORMAL

## 2024-10-30 LAB
AFP L3 MFR SERPL: NORMAL % (ref 0–9.9)
AFP SERPL-MCNC: 2.8 NG/ML (ref 0–5.7)

## 2025-03-03 ENCOUNTER — CLINICAL DOCUMENTATION (OUTPATIENT)
Age: 30
End: 2025-03-03

## 2025-03-03 RX ORDER — TENOFOVIR ALAFENAMIDE 25 MG/1
TABLET ORAL
Qty: 90 TABLET | Refills: 3 | Status: SHIPPED | OUTPATIENT
Start: 2025-03-03

## 2025-03-05 ENCOUNTER — TELEPHONE (OUTPATIENT)
Age: 30
End: 2025-03-05

## 2025-03-05 NOTE — TELEPHONE ENCOUNTER
Left message informing patient of information regarding his  mix up in system. Advised patient to contact his insurance company to get this corrected.   ----- Message from Sherlyn BARFIELD sent at 3/3/2025  2:02 PM EST -----  Regarding: RE: Insurance  Please call patient to advise that we received a message back from our system indicating that his  was [phone] NOT [phone] as indicated on file and on his government ID on file.   He will need to contact his insurance company to get this information updated in order for his claims and inquiries to process correctly.  ----- Message -----  From: Sherly Ramsey  Sent: 2025   4:13 PM EST  To: Sherlyn Bella  Subject: Insurance                                        Patient called today with his new insurance information. [employer]  ID# [id number]. He said he is under his girlfriend's insurance. Her name is Elva Daniel : 10/12/94. I have tried to run it both ways and I can't get it to verify. His contact info is [phone].    Patient also has an order for Vemlidy that has been ordered since August . He does not have any of this

## 2025-04-28 ENCOUNTER — OFFICE VISIT (OUTPATIENT)
Age: 30
End: 2025-04-28
Payer: COMMERCIAL

## 2025-04-28 VITALS
HEIGHT: 68 IN | OXYGEN SATURATION: 99 % | HEART RATE: 96 BPM | DIASTOLIC BLOOD PRESSURE: 104 MMHG | TEMPERATURE: 97.5 F | BODY MASS INDEX: 26.95 KG/M2 | WEIGHT: 177.8 LBS | SYSTOLIC BLOOD PRESSURE: 153 MMHG

## 2025-04-28 DIAGNOSIS — B18.1 CHRONIC VIRAL HEPATITIS B WITHOUT DELTA AGENT AND WITHOUT COMA (HCC): Primary | ICD-10-CM

## 2025-04-28 LAB
ALBUMIN SERPL-MCNC: 4.4 G/DL (ref 3.5–5)
ALBUMIN/GLOB SERPL: 1.2 (ref 1.1–2.2)
ALP SERPL-CCNC: 100 U/L (ref 45–117)
ALT SERPL-CCNC: 100 U/L (ref 12–78)
ANION GAP SERPL CALC-SCNC: 11 MMOL/L (ref 2–12)
AST SERPL-CCNC: 48 U/L (ref 15–37)
BILIRUB DIRECT SERPL-MCNC: <0.1 MG/DL (ref 0–0.2)
BILIRUB SERPL-MCNC: 0.4 MG/DL (ref 0.2–1)
BUN SERPL-MCNC: 7 MG/DL (ref 6–20)
BUN/CREAT SERPL: 6 (ref 12–20)
CALCIUM SERPL-MCNC: 9.9 MG/DL (ref 8.5–10.1)
CHLORIDE SERPL-SCNC: 104 MMOL/L (ref 97–108)
CO2 SERPL-SCNC: 26 MMOL/L (ref 21–32)
CREAT SERPL-MCNC: 1.27 MG/DL (ref 0.7–1.3)
ERYTHROCYTE [DISTWIDTH] IN BLOOD BY AUTOMATED COUNT: 13 % (ref 11.5–14.5)
GLOBULIN SER CALC-MCNC: 3.8 G/DL (ref 2–4)
GLUCOSE SERPL-MCNC: 97 MG/DL (ref 65–100)
HCT VFR BLD AUTO: 51.1 % (ref 36.6–50.3)
HGB BLD-MCNC: 15.6 G/DL (ref 12.1–17)
MCH RBC QN AUTO: 25.7 PG (ref 26–34)
MCHC RBC AUTO-ENTMCNC: 30.5 G/DL (ref 30–36.5)
MCV RBC AUTO: 84 FL (ref 80–99)
NRBC # BLD: 0 K/UL (ref 0–0.01)
NRBC BLD-RTO: 0 PER 100 WBC
PLATELET # BLD AUTO: 221 K/UL (ref 150–400)
PMV BLD AUTO: 11.7 FL (ref 8.9–12.9)
POTASSIUM SERPL-SCNC: 4 MMOL/L (ref 3.5–5.1)
PROT SERPL-MCNC: 8.2 G/DL (ref 6.4–8.2)
RBC # BLD AUTO: 6.08 M/UL (ref 4.1–5.7)
SODIUM SERPL-SCNC: 141 MMOL/L (ref 136–145)
WBC # BLD AUTO: 5.8 K/UL (ref 4.1–11.1)

## 2025-04-28 PROCEDURE — 99214 OFFICE O/P EST MOD 30 MIN: CPT | Performed by: PHYSICIAN ASSISTANT

## 2025-04-28 PROCEDURE — 91200 LIVER ELASTOGRAPHY: CPT | Performed by: PHYSICIAN ASSISTANT

## 2025-04-28 ASSESSMENT — ANXIETY QUESTIONNAIRES
3. WORRYING TOO MUCH ABOUT DIFFERENT THINGS: NOT AT ALL
7. FEELING AFRAID AS IF SOMETHING AWFUL MIGHT HAPPEN: NOT AT ALL
GAD7 TOTAL SCORE: 0
IF YOU CHECKED OFF ANY PROBLEMS ON THIS QUESTIONNAIRE, HOW DIFFICULT HAVE THESE PROBLEMS MADE IT FOR YOU TO DO YOUR WORK, TAKE CARE OF THINGS AT HOME, OR GET ALONG WITH OTHER PEOPLE: NOT DIFFICULT AT ALL
4. TROUBLE RELAXING: NOT AT ALL
6. BECOMING EASILY ANNOYED OR IRRITABLE: NOT AT ALL
2. NOT BEING ABLE TO STOP OR CONTROL WORRYING: NOT AT ALL
5. BEING SO RESTLESS THAT IT IS HARD TO SIT STILL: NOT AT ALL
1. FEELING NERVOUS, ANXIOUS, OR ON EDGE: NOT AT ALL

## 2025-04-28 ASSESSMENT — PATIENT HEALTH QUESTIONNAIRE - PHQ9
SUM OF ALL RESPONSES TO PHQ QUESTIONS 1-9: 0
1. LITTLE INTEREST OR PLEASURE IN DOING THINGS: NOT AT ALL
DEPRESSION UNABLE TO ASSESS: FUNCTIONAL CAPACITY MOTIVATION LIMITS ACCURACY
2. FEELING DOWN, DEPRESSED OR HOPELESS: NOT AT ALL

## 2025-04-28 NOTE — PROGRESS NOTES
Chief Complaint   Patient presents with    Follow-up     Vitals:    04/28/25 0953   BP: (!) 153/104   Pulse: 96   Temp:    SpO2:      Have you been to the ER, urgent care clinic since your last visit?  Hospitalized since your last visit?   NO    Have you seen or consulted any other health care providers outside our system since your last visit?   NO             
AB Negative  Negative      Hep D AB Negative    Negative     Ferritin 30 - 400 ng/mL   227    Iron % Saturation 15 - 55 %   28    ASMCA 0 - 19 Units   8    LKM 0.0 - 20.0 Units   0.9    Ceruloplasmin 16.0 - 31.0 mg/dL   27.4    Alpha-1 antitrypsin level 95 - 164 mg/dL   122    2021.  Hep Be Ag pos, Hep Be Ab neg  2023. Hep D Ab neg  2024. Hep Be Ag pos, Hep Be Ab neg    LIVER HISTOLOGY:  9/2021. FibroScan performed at [facility]. EkPa was 5.4. IQR/med 4%. . The results suggested a fibrosis level of F0. The CAP score suggests fatty liver.  10/2022.  FibroScan performed at [facility]. EkPa was 4.6.  Suggested fibrosis level is F0-1.  CAP score is 212, no steatosis.  4/2025.  FibroScan performed at [facility]. EkPa was 5.2.  Suggested fibrosis level is F0-1. CAP score is 331, this is consistent with steatosis.    ENDOSCOPIC PROCEDURES:  Not available or performed    RADIOLOGY:  9/27/2021. Abdominal ultrasound. No masses or lesions.   9/2022.  Ultrasound of liver.  Echogenic consistent with chronic liver disease.  No liver mass lesions.  No dilated bile ducts.  No ascites.  10/2023.  Ultrasound of liver.  Echogenic consistent with chronic liver disease/steatosis.  No liver mass lesions.  No dilated bile ducts.  No ascites.  10/2024. Ultrasound of abdomen. Increased echogenicity, no mass/lesion. No ascites. No biliary ductal dilatation.     OTHER TESTING:  Not available or performed    FOLLOW-UP:  All of the issues listed above in the assessment and plan were discussed with the patient. All questions were answered. The patient expressed a clear understanding of the above.    Follow-up [facility] in 6 months with repeat US at that time. Continue with Abdirahman.     Janee Lu PA-C  [address], [address]  [place]  [address] [phone]  [facility]

## 2025-04-29 LAB — HBV E AB SERPL QL IA: NON REACTIVE

## 2025-05-01 LAB — HBV E AG SERPL QL IA: POSITIVE

## 2025-05-02 LAB
HBV DNA SERPL NAA+PROBE-ACNC: 50 IU/ML
HBV DNA SERPL NAA+PROBE-LOG IU: 1.7 LOG10 IU/ML
TEST INFORMATION: NORMAL

## 2025-05-12 LAB
AFP L3 MFR SERPL: NORMAL % (ref 0–9.9)
AFP SERPL-MCNC: 2.7 NG/ML (ref 0–5.7)

## 2025-05-23 ENCOUNTER — CLINICAL DOCUMENTATION (OUTPATIENT)
Age: 30
End: 2025-05-23

## 2025-05-23 ENCOUNTER — RESULTS FOLLOW-UP (OUTPATIENT)
Age: 30
End: 2025-05-23

## 2025-05-23 ENCOUNTER — TELEPHONE (OUTPATIENT)
Age: 30
End: 2025-05-23

## 2025-05-23 DIAGNOSIS — B18.1 CHRONIC VIRAL HEPATITIS B WITHOUT DELTA AGENT AND WITHOUT COMA (HCC): Primary | ICD-10-CM

## 2025-05-23 RX ORDER — ENTECAVIR 0.5 MG/1
0.5 TABLET, FILM COATED ORAL DAILY
Qty: 30 TABLET | Refills: 11 | Status: ACTIVE | OUTPATIENT
Start: 2025-05-23

## 2025-05-27 ENCOUNTER — CLINICAL DOCUMENTATION (OUTPATIENT)
Age: 30
End: 2025-05-27

## 2025-05-27 NOTE — PROGRESS NOTES
Janee Lu, Briana Wagner LPN  Please mail pt copy of labs I placed on 5/23/25.  Thanks.    5/27/25 10:09am: Above lab order mailed to pt. SQ

## 2025-08-26 LAB
ALBUMIN SERPL-MCNC: 4.9 G/DL (ref 4.3–5.2)
ALP SERPL-CCNC: 98 IU/L (ref 44–121)
ALT SERPL-CCNC: 57 IU/L (ref 0–44)
AST SERPL-CCNC: 35 IU/L (ref 0–40)
BILIRUB SERPL-MCNC: 0.4 MG/DL (ref 0–1.2)
BUN SERPL-MCNC: 10 MG/DL (ref 6–20)
BUN/CREAT SERPL: 9 (ref 9–20)
CALCIUM SERPL-MCNC: 10 MG/DL (ref 8.7–10.2)
CHLORIDE SERPL-SCNC: 103 MMOL/L (ref 96–106)
CO2 SERPL-SCNC: 21 MMOL/L (ref 20–29)
CREAT SERPL-MCNC: 1.15 MG/DL (ref 0.76–1.27)
EGFRCR SERPLBLD CKD-EPI 2021: 88 ML/MIN/1.73
ERYTHROCYTE [DISTWIDTH] IN BLOOD BY AUTOMATED COUNT: 14.5 % (ref 11.6–15.4)
GLOBULIN SER CALC-MCNC: 2.8 G/DL (ref 1.5–4.5)
GLUCOSE SERPL-MCNC: 91 MG/DL (ref 70–99)
HBV DNA SERPL NAA+PROBE-ACNC: 20 IU/ML
HBV DNA SERPL NAA+PROBE-LOG IU: 1.3 LOG10 IU/ML
HCT VFR BLD AUTO: 48.3 % (ref 37.5–51)
HGB BLD-MCNC: 15.3 G/DL (ref 13–17.7)
MCH RBC QN AUTO: 26.5 PG (ref 26.6–33)
MCHC RBC AUTO-ENTMCNC: 31.7 G/DL (ref 31.5–35.7)
MCV RBC AUTO: 84 FL (ref 79–97)
PLATELET # BLD AUTO: 213 X10E3/UL (ref 150–450)
POTASSIUM SERPL-SCNC: 4.4 MMOL/L (ref 3.5–5.2)
PROT SERPL-MCNC: 7.7 G/DL (ref 6–8.5)
RBC # BLD AUTO: 5.78 X10E6/UL (ref 4.14–5.8)
REF LAB TEST REF RANGE: NORMAL
SODIUM SERPL-SCNC: 139 MMOL/L (ref 134–144)
WBC # BLD AUTO: 5.7 X10E3/UL (ref 3.4–10.8)